# Patient Record
Sex: MALE | Race: WHITE | NOT HISPANIC OR LATINO
[De-identification: names, ages, dates, MRNs, and addresses within clinical notes are randomized per-mention and may not be internally consistent; named-entity substitution may affect disease eponyms.]

---

## 2018-01-18 PROBLEM — Z00.00 ENCOUNTER FOR PREVENTIVE HEALTH EXAMINATION: Status: ACTIVE | Noted: 2018-01-18

## 2018-01-29 ENCOUNTER — APPOINTMENT (OUTPATIENT)
Dept: ORTHOPEDIC SURGERY | Facility: CLINIC | Age: 73
End: 2018-01-29

## 2022-02-03 ENCOUNTER — LABORATORY RESULT (OUTPATIENT)
Age: 77
End: 2022-02-03

## 2022-02-04 ENCOUNTER — APPOINTMENT (OUTPATIENT)
Dept: HEART AND VASCULAR | Facility: CLINIC | Age: 77
End: 2022-02-04
Payer: MEDICARE

## 2022-02-04 ENCOUNTER — NON-APPOINTMENT (OUTPATIENT)
Age: 77
End: 2022-02-04

## 2022-02-04 VITALS
HEIGHT: 68 IN | HEART RATE: 103 BPM | TEMPERATURE: 98.7 F | OXYGEN SATURATION: 95 % | DIASTOLIC BLOOD PRESSURE: 70 MMHG | BODY MASS INDEX: 26.83 KG/M2 | WEIGHT: 177 LBS | SYSTOLIC BLOOD PRESSURE: 110 MMHG

## 2022-02-04 DIAGNOSIS — I50.20 UNSPECIFIED SYSTOLIC (CONGESTIVE) HEART FAILURE: ICD-10-CM

## 2022-02-04 PROCEDURE — 99215 OFFICE O/P EST HI 40 MIN: CPT

## 2022-02-04 PROCEDURE — 93000 ELECTROCARDIOGRAM COMPLETE: CPT

## 2022-02-04 RX ORDER — SPIRONOLACTONE 25 MG/1
25 TABLET ORAL
Qty: 30 | Refills: 3 | Status: ACTIVE | COMMUNITY
Start: 2022-02-04 | End: 1900-01-01

## 2022-02-04 RX ORDER — VALSARTAN 40 MG/1
40 TABLET, COATED ORAL TWICE DAILY
Qty: 30 | Refills: 0 | Status: ACTIVE | COMMUNITY
Start: 2022-02-04 | End: 1900-01-01

## 2022-04-26 ENCOUNTER — APPOINTMENT (OUTPATIENT)
Dept: HEART AND VASCULAR | Facility: CLINIC | Age: 77
End: 2022-04-26

## 2022-04-26 ENCOUNTER — NON-APPOINTMENT (OUTPATIENT)
Age: 77
End: 2022-04-26

## 2022-04-26 VITALS
SYSTOLIC BLOOD PRESSURE: 110 MMHG | BODY MASS INDEX: 26.52 KG/M2 | OXYGEN SATURATION: 96 % | TEMPERATURE: 97.3 F | HEART RATE: 97 BPM | DIASTOLIC BLOOD PRESSURE: 70 MMHG | HEIGHT: 68 IN | WEIGHT: 175 LBS

## 2022-04-26 PROCEDURE — 93000 ELECTROCARDIOGRAM COMPLETE: CPT

## 2022-04-26 PROCEDURE — 99215 OFFICE O/P EST HI 40 MIN: CPT | Mod: 25

## 2022-04-26 RX ORDER — FUROSEMIDE 20 MG/1
20 TABLET ORAL DAILY
Refills: 0 | Status: DISCONTINUED | COMMUNITY
End: 2022-04-26

## 2022-04-26 RX ORDER — METOPROLOL SUCCINATE 25 MG/1
25 TABLET, EXTENDED RELEASE ORAL DAILY
Refills: 0 | Status: DISCONTINUED | COMMUNITY
End: 2022-04-26

## 2022-04-26 RX ORDER — TORSEMIDE 20 MG/1
20 TABLET ORAL DAILY
Qty: 60 | Refills: 3 | Status: ACTIVE | COMMUNITY
Start: 2022-04-26

## 2022-08-07 NOTE — ASSESSMENT
[FreeTextEntry1] : 76/M with h/o CAD s/p CABG, ICM, HFrEF 35% in 2017--> 20% in 2020, no ICD, Exsmoker, with recent admission for ADHF with worsening EF. \par \par \par ACC/ AHA stage C HF\par LVEDD 6.4cms, EF 20%, Mild MR, Mod TR, RVSP 48mmHg\par appears euvolemic\par recommended starting torsemide 40mg daily\par will hold BB\par resume spironolactone, will cont valsartan\par again discussed in detail regarding the benefits of the GDMT with pt and his wife. \par recommended hospitalization but pt and his wife declined. understood the risk of worsening to cardiogenic shock and arrhythmia and death. \par recommended follow up in 2 weeks for further medication titration. 
Declined

## 2022-08-07 NOTE — HISTORY OF PRESENT ILLNESS
Jac [FreeTextEntry1] : 76/M with h/o CAD s/p CABG, HFrEF 35% in 2017--> 20% in 2020, no ICD who was admitted after being seen in cardiology clinic with worsening SOB. Pt noted to be volume overloaded and was not complinat with his medication. Pt was diuresed and TTE done showed worseing of his EF 15%, LVEDD 6.4cm, Mild MR, Mod TR RVSP 48mmHg. Last seen in Feb 2022 post hospital d/c. now presented after being seen in the Pulm clinic for SOB with b/l oedema extending upto abdominal wall. \par Pt has stopped taking his diuretics. his wife has concerns about his medications. explained in detail about his illness of heart failure and need for diuretics and recommended that he be hospitalized for diuresis with IV lasix. \par \par Pt and his wife declined and wants to continue po diuretics. \par

## 2022-08-07 NOTE — REVIEW OF SYSTEMS
[Fever] : no fever [Headache] : no headache [Syncope] : no syncope [Abdominal Pain] : no abdominal pain [Nausea] : no nausea [Vomiting] : no vomiting [Dizziness] : no dizziness [Tremor] : no tremor was seen [Weakness] : no weakness [Speech Disturbance] : no speech disturbance [Confusion] : no confusion was observed

## 2022-08-07 NOTE — HISTORY OF PRESENT ILLNESS
[FreeTextEntry1] : 76/M with h/o CAD s/p CABG, HFrEF 35% in 2017--> 20% in 2020, no ICD who was admitted after being seen in cardiology clinic with worsening SOB. Pt noted to be volume overloaded and was not complinat with his medication. Pt was diuresed and TTE done showed worseing of his EF 15%, LVEDD 6.4cm, Mild MR, Mod TR RVSP 48mmHg. Pt was started on entresto 24-26mg BID, Serena 12.5mg and switched coreg to metoprolol cl 25mg po daily. now presents for follow up. \par was seen in Dr. Walker clinic and his entresto and serena held due to hypotention at home. continued to take Lasix and Metoprolol xl. \par No SOB at rest. No leg swelling. No palpitation. reports he does feel dizzy at times but denies any LOC. \par \par Has been back to work. \par \par WIfe accompanied patient was expressing concerns about his dizziness and tiredness at times and very concerned about all the medication that he is on. has a detailed and long conversation about the importance of the medication and its survival benefits. \par

## 2022-08-07 NOTE — ASSESSMENT
[FreeTextEntry1] : 76/M with h/o CAD s/p CABG, ICM, HFrEF 35% in 2017--> 20% in 2020, no ICD, Exsmoker, with recent admission for ADHF with worsening EF. \par \par \par ACC/ AHA stage C HF\par LVEDD 6.4cms, EF 20%, Mild MR, Mod TR, RVSP 48mmHg\par appears euvolemic\par  lasix 20mg daily po\par cont metoprolol xl 25mg po daily\par Entresto and spironolactone held. \par recommended starting valsartan 20mg po BID and resume spironolactone 12.5mg po daily. \par recommended to space out his medication Valsartan in AM, serena in the afternoon and Metop + valsartan at bedtime. \par again discussed in detail regarding the benefits of the GDMT with pt and his wife. \par Will follow with Dr. shahid and f/u with me in 2-4 weeks\par \par doesn’t have ICD. will need EP refferal for primary prevention ICD\par EKG sinus incomplete RBBB, QRS 126msec. \par NST with mild ischemia and transmural infarct in the anterior, inferior wall similar to the viability study in 2020. So didn’t get LHC.

## 2022-08-07 NOTE — REVIEW OF SYSTEMS
[Feeling Fatigued] : feeling fatigued [SOB] : shortness of breath [Dyspnea on exertion] : dyspnea during exertion [Lower Ext Edema] : lower extremity edema [Orthopnea] : orthopnea [PND] : PND

## 2022-08-07 NOTE — CARDIOLOGY SUMMARY
[de-identified] : 1/2022:\par IMPRESSION:\par 1.  Clinically negative response to Lexiscan. Nondiagnostic EKG portion of the\par test\par 2.  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 Mild linn-infarct\par ischemia. Overall ejection fraction is severely depressed. Combined ischemic\par and nonischemic cardiomyopathy. The left ventricle is markedly dilated\par 3. In comparison with prior study dated May 2020, LVEF has diminished further [de-identified] : 1/12/22:\par DIMENSIONS (cm)	Adult Normal Value, cm.\par Septal Thickness: 1.10. 	< 1.1\par LV Diastolic: 6.40. 	< 5.5		\par Posterior Wall Thickness: 1.10. 	< 1.1\par LV Systolic: 6.00. 	< 4.5\par \par IMPRESSION:\par  The left atrium is mildly dilated. The left ventricle shows LVH. The left ventricle shows reduced EF. The right atrium is mildly dilated. The RV size is normal. RV function is normal. The atrial septal is normal. The aortic root is normal.\par \par EF is 20% The apical wall motion is Akinetic.  Severe diffuse LV hypokinesis\par \par The aortic valve trileaflets are thickened and calcified. The peak AV gradient is 11.20 mmHg. The mean AV gradient is 6.80 mmHg. There is trace aortic regurgitation.\par \par There is mitral annular calcification. There is mild mitral regurgitation.\par \par Tricuspid valve shows moderate regurgitation. The calculated pulmonary artery systolic pressure is 48mmHg. [de-identified] : Stress test\par NST on 1/22/2022:\par IMPRESSION:\par 1.  Clinically negative response to Lexiscan. Nondiagnostic EKG portion of the\par test\par 2.  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 Mild linn-infarct\par ischemia. Overall ejection fraction is severely depressed. Combined ischemic\par and nonischemic cardiomyopathy. The left ventricle is markedly dilated\par 3. In comparison with prior study dated May 2020, LVEF has diminished further\par Regadenoson NST: 5/13/2020\par SUMMARY:    \par  1. Severely abnormal myocardial perfusion SPECT study consistent with LAD and  \par  obtuse marginal territory infarcts with minimal anterior ischemia, mild to  \par  moderate lateral ischemia and severely impaired LV systolic function.  \par  2. Compared with the previous study, anterior wall scarring is more severe and  \par  extensive with new evidence of lateral wall scar and ischemia as detailed  \par  above. LVEF not 20% previously 34% (reportedly 19% in January 2014).

## 2022-08-07 NOTE — PHYSICAL EXAM
[Normal Venous Pressure] : normal venous pressure [No Murmur] : no murmur [No Rub] : no rub [Clear Lung Fields] : clear lung fields [No Edema] : no edema [No Acute Distress] : no acute distress [Elevated JVD ____cm] : elevated JVD ~Vcm [Normal S1, S2] : normal S1, S2 [Soft] : abdomen soft [Non Tender] : non-tender [Normal Bowel Sounds] : normal bowel sounds [No Focal Deficits] : no focal deficits [Alert and Oriented] : alert and oriented

## 2022-08-07 NOTE — CARDIOLOGY SUMMARY
[de-identified] : 1/2022:\par IMPRESSION:\par 1.  Clinically negative response to Lexiscan. Nondiagnostic EKG portion of the\par test\par 2.  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 Mild linn-infarct\par ischemia. Overall ejection fraction is severely depressed. Combined ischemic\par and nonischemic cardiomyopathy. The left ventricle is markedly dilated\par 3. In comparison with prior study dated May 2020, LVEF has diminished further [de-identified] : 1/12/22:\par DIMENSIONS (cm)	Adult Normal Value, cm.\par Septal Thickness: 1.10. 	< 1.1\par LV Diastolic: 6.40. 	< 5.5		\par Posterior Wall Thickness: 1.10. 	< 1.1\par LV Systolic: 6.00. 	< 4.5\par \par IMPRESSION:\par  The left atrium is mildly dilated. The left ventricle shows LVH. The left ventricle shows reduced EF. The right atrium is mildly dilated. The RV size is normal. RV function is normal. The atrial septal is normal. The aortic root is normal.\par \par EF is 20% The apical wall motion is Akinetic.  Severe diffuse LV hypokinesis\par \par The aortic valve trileaflets are thickened and calcified. The peak AV gradient is 11.20 mmHg. The mean AV gradient is 6.80 mmHg. There is trace aortic regurgitation.\par \par There is mitral annular calcification. There is mild mitral regurgitation.\par \par Tricuspid valve shows moderate regurgitation. The calculated pulmonary artery systolic pressure is 48mmHg. [de-identified] : Stress test\par NST on 1/22/2022:\par IMPRESSION:\par 1.  Clinically negative response to Lexiscan. Nondiagnostic EKG portion of the\par test\par 2.  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 Mild linn-infarct\par ischemia. Overall ejection fraction is severely depressed. Combined ischemic\par and nonischemic cardiomyopathy. The left ventricle is markedly dilated\par 3. In comparison with prior study dated May 2020, LVEF has diminished further\par Regadenoson NST: 5/13/2020\par SUMMARY:    \par  1. Severely abnormal myocardial perfusion SPECT study consistent with LAD and  \par  obtuse marginal territory infarcts with minimal anterior ischemia, mild to  \par  moderate lateral ischemia and severely impaired LV systolic function.  \par  2. Compared with the previous study, anterior wall scarring is more severe and  \par  extensive with new evidence of lateral wall scar and ischemia as detailed  \par  above. LVEF not 20% previously 34% (reportedly 19% in January 2014).